# Patient Record
(demographics unavailable — no encounter records)

---

## 2017-04-27 NOTE — PHYS DOC
Past Medical History


Past Medical History:  Other


Additional Past Medical Histor:  "anger disorder";


Past Surgical History:  Other


Additional Past Surgical Histo:  carpal tunnel; toe; back


Alcohol Use:  Heavy


Drug Use:  None





Adult General


Chief Complaint


Chief Complaint:  ITCHING





HPI


HPI


Patient is a 54  year old male presents emergency department stating that he 

has poison ivy throughout his body for the last 4 days. He's been using over-the

-counter calamine and Caladryl lotion as well as some IV lotion cream. Patient 

states he is not having any relief from these medications. Patient does state 

he has not tried Benadryl as Benadryl causes him to have restless leg syndrome. 

He denies any shortness of air difficulty breathing. Patient with redness noted 

throughout his arms and his chest and abdomen area.





Review of Systems


Review of Systems





Constitutional: Denies fever or chills []


Eyes: Denies change in visual acuity, redness, or eye pain []


HENT: Denies nasal congestion or sore throat []


Respiratory: Denies cough or shortness of breath []


Cardiovascular: No additional information not addressed in HPI []


GI: Denies abdominal pain, nausea, vomiting, bloody stools or diarrhea []


: Denies dysuria or hematuria []


Musculoskeletal: Denies back pain or joint pain []


Integument:  rash denies skin lesions []


Neurologic: Denies headache, focal weakness or sensory changes []





Allergies


Allergies





 Allergies








Coded Allergies Type Severity Reaction Last Updated Verified


 


  bupivacaine Allergy Unknown  8/27/14 No











Physical Exam


Physical Exam





Constitutional: Well developed, well nourished, no acute distress, non-toxic 

appearance. []


HENT: Normocephalic, atraumatic, bilateral external ears normal, oropharynx 

moist, no oral exudates, nose normal. []


Eyes: PERRLA, EOMI, conjunctiva normal, no discharge. [] 


Neck: Normal range of motion, no tenderness, supple, no stridor. [] 


Cardiovascular:Heart rate regular rhythm, no murmur []


Lungs & Thorax:  Bilateral breath sounds clear to auscultation []


Skin: Warm, dry, no erythema. Patient with red irritated raised rash noted 

throughout the arms chest and abdomen area no drainage or discharge noted from 

the areas.


Back: No tenderness


Extremities: No tenderness, no cyanosis, no clubbing, ROM intact, no edema. [] 


Neurologic: Alert and oriented X 3, normal motor function, normal sensory 

function, no focal deficits noted. []


Psychologic: Affect normal, judgement normal, mood normal. []





EKG


EKG


[]





Radiology/Procedures


Radiology/Procedures


[]





Course & Med Decision Making


Course & Med Decision Making


Pertinent Labs and Imaging studies reviewed. (See chart for details)


Patient will be discharged home with recommendations to use Benadryl 25 mg 

every 4-6 hours he was also instructed that this medication will cause 

drowsiness do not take any be alert and oriented. Patient was also provided a 

prescription for Atarax in which she may use in place of Benadryl. Patient will 

be provided with a Solu-Medrol injection here in the emergency department. He'

ll be placed on prednisone to take on a daily basis for the next 7 days. 

Patient was also recommended to use Aveeno baths over-the-counter to help 

soothe the skin keeping the areas clean dry and cool also help. Patient was 

also recommended to continue to use calamine lotion. Patient will be discharged 

home in stable condition signs symptoms to return back to emergency department 

as been provided. Patient agrees with discharge instructions, treatment 

regimens and follow-up recommendations.


[]





Dragon Disclaimer


Dragon Disclaimer


This electronic medical record was generated, in whole or in part, using a 

voice recognition dictation system.





Departure


Departure


Impression:  


 Primary Impression:  


 Contact dermatitis


Disposition:  01 HOME, SELF-CARE


Condition:  STABLE


Referrals:  


NON,STAFF (PCP)


Patient Instructions:  Contact Dermatitis, Easy-to-Read





Additional Instructions:


Your being treated for contact dermatitis. Your provided with Solu-Medrol here 

in the emergency department.


Keep the areas clean dry and cool.


Benadryl 25 mg every 4-6 hours as needed for itching and irritation. This 

medication will cause drowsiness do not take any be alert and oriented,or may 

take the prescription for Atarax that will help with itching. Do not take both 

medications.


Calamine lotion may also help with the irritation.


Medication as prescribed.


Aveeno baths may also help soothe the skin.


Follow-up with a your primary care physician in the next 3-5 days.


Return back to emergency department for signs and symptoms of become worse.


Scripts


Hydroxyzine Hcl 25 Mg Tablet1 Tab PO TID #30 TAB


   Prov:SARITHA SIMON         4/27/17


Prednisone 20 Mg Pdkdbg37 Mg PO DAILY #14 TAB


   Prov:SARITHA SIMON         4/27/17








SARITHA SIMON Apr 27, 2017 01:01